# Patient Record
Sex: MALE | Race: BLACK OR AFRICAN AMERICAN
[De-identification: names, ages, dates, MRNs, and addresses within clinical notes are randomized per-mention and may not be internally consistent; named-entity substitution may affect disease eponyms.]

---

## 2019-10-04 ENCOUNTER — HOSPITAL ENCOUNTER (EMERGENCY)
Dept: HOSPITAL 61 - ER | Age: 64
Discharge: HOME | End: 2019-10-04
Payer: MEDICARE

## 2019-10-04 VITALS — DIASTOLIC BLOOD PRESSURE: 79 MMHG | SYSTOLIC BLOOD PRESSURE: 122 MMHG

## 2019-10-04 VITALS — WEIGHT: 180 LBS | BODY MASS INDEX: 24.38 KG/M2 | HEIGHT: 72 IN

## 2019-10-04 DIAGNOSIS — E11.9: ICD-10-CM

## 2019-10-04 DIAGNOSIS — F14.10: Primary | ICD-10-CM

## 2019-10-04 DIAGNOSIS — F12.10: ICD-10-CM

## 2019-10-04 DIAGNOSIS — R41.82: ICD-10-CM

## 2019-10-04 LAB
ACETAMIN: < 2 MCG/ML (ref 10–30)
ALBUMIN SERPL-MCNC: 3 G/DL (ref 3.4–5)
ALBUMIN/GLOB SERPL: 0.7 {RATIO} (ref 1–1.7)
ALP SERPL-CCNC: 122 U/L (ref 46–116)
ALT SERPL-CCNC: 53 U/L (ref 16–63)
AMPHETAMINE/METHAMPHETAMINE: (no result)
ANION GAP SERPL CALC-SCNC: 8 MMOL/L (ref 6–14)
APTT BLD: 24 SEC (ref 24–38)
APTT PPP: (no result) S
AST SERPL-CCNC: 75 U/L (ref 15–37)
BACTERIA #/AREA URNS HPF: 0 /HPF
BARBITURATES UR-MCNC: (no result) UG/ML
BASOPHILS # BLD AUTO: 0.1 X10^3/UL (ref 0–0.2)
BASOPHILS NFR BLD: 1 % (ref 0–3)
BENZODIAZ UR-MCNC: (no result) UG/L
BILIRUB SERPL-MCNC: 0.9 MG/DL (ref 0.2–1)
BILIRUB UR QL STRIP: (no result)
BUN SERPL-MCNC: 14 MG/DL (ref 8–26)
BUN/CREAT SERPL: 12 (ref 6–20)
CALCIUM SERPL-MCNC: 9.2 MG/DL (ref 8.5–10.1)
CANNABINOIDS UR-MCNC: (no result) UG/L
CHLORIDE SERPL-SCNC: 100 MMOL/L (ref 98–107)
CO2 SERPL-SCNC: 26 MMOL/L (ref 21–32)
COCAINE UR-MCNC: (no result) NG/ML
CREAT SERPL-MCNC: 1.2 MG/DL (ref 0.7–1.3)
EOSINOPHIL NFR BLD: 0 X10^3/UL (ref 0–0.7)
EOSINOPHIL NFR BLD: 1 % (ref 0–3)
ERYTHROCYTE [DISTWIDTH] IN BLOOD BY AUTOMATED COUNT: 16 % (ref 11.5–14.5)
ETHANOL SERPL-MCNC: < 10 MG/DL (ref 0–10)
FIBRINOGEN PPP-MCNC: CLEAR MG/DL
GFR SERPLBLD BASED ON 1.73 SQ M-ARVRAT: 73.5 ML/MIN
GLOBULIN SER-MCNC: 4.5 G/DL (ref 2.2–3.8)
GLUCOSE SERPL-MCNC: 306 MG/DL (ref 70–99)
HCT VFR BLD CALC: 45.4 % (ref 39–53)
HGB BLD-MCNC: 15.2 G/DL (ref 13–17.5)
LYMPHOCYTES # BLD: 2.4 X10^3/UL (ref 1–4.8)
LYMPHOCYTES NFR BLD AUTO: 38 % (ref 24–48)
MAGNESIUM SERPL-MCNC: 1.9 MG/DL (ref 1.8–2.4)
MCH RBC QN AUTO: 30 PG (ref 25–35)
MCHC RBC AUTO-ENTMCNC: 34 G/DL (ref 31–37)
MCV RBC AUTO: 90 FL (ref 79–100)
METHADONE SERPL-MCNC: (no result) NG/ML
MONO #: 0.5 X10^3/UL (ref 0–1.1)
MONOCYTES NFR BLD: 8 % (ref 0–9)
NEUT #: 3.4 X10^3/UL (ref 1.8–7.7)
NEUTROPHILS NFR BLD AUTO: 52 % (ref 31–73)
NITRITE UR QL STRIP: NEGATIVE
OPIATES UR-MCNC: (no result) NG/ML
PCP SERPL-MCNC: (no result) MG/DL
PH UR STRIP: 6 [PH]
PLATELET # BLD AUTO: 174 X10^3/UL (ref 140–400)
POTASSIUM SERPL-SCNC: 4.5 MMOL/L (ref 3.5–5.1)
PROT SERPL-MCNC: 7.5 G/DL (ref 6.4–8.2)
PROT UR STRIP-MCNC: 100 MG/DL
PROTHROMBIN TIME: 13 SEC (ref 11.7–14)
RBC # BLD AUTO: 5.02 X10^6/UL (ref 4.3–5.7)
RBC #/AREA URNS HPF: (no result) /HPF (ref 0–2)
SALIC: < 2.8 MG/DL (ref 2.8–20)
SODIUM SERPL-SCNC: 134 MMOL/L (ref 136–145)
SQUAMOUS #/AREA URNS LPF: (no result) /LPF
UROBILINOGEN UR-MCNC: 1 MG/DL
WBC # BLD AUTO: 6.4 X10^3/UL (ref 4–11)
WBC #/AREA URNS HPF: 0 /HPF (ref 0–4)

## 2019-10-04 PROCEDURE — 93005 ELECTROCARDIOGRAM TRACING: CPT

## 2019-10-04 PROCEDURE — 82962 GLUCOSE BLOOD TEST: CPT

## 2019-10-04 PROCEDURE — 85610 PROTHROMBIN TIME: CPT

## 2019-10-04 PROCEDURE — 80053 COMPREHEN METABOLIC PANEL: CPT

## 2019-10-04 PROCEDURE — 36415 COLL VENOUS BLD VENIPUNCTURE: CPT

## 2019-10-04 PROCEDURE — G0480 DRUG TEST DEF 1-7 CLASSES: HCPCS

## 2019-10-04 PROCEDURE — 80307 DRUG TEST PRSMV CHEM ANLYZR: CPT

## 2019-10-04 PROCEDURE — 99285 EMERGENCY DEPT VISIT HI MDM: CPT

## 2019-10-04 PROCEDURE — 83735 ASSAY OF MAGNESIUM: CPT

## 2019-10-04 PROCEDURE — 83880 ASSAY OF NATRIURETIC PEPTIDE: CPT

## 2019-10-04 PROCEDURE — 85730 THROMBOPLASTIN TIME PARTIAL: CPT

## 2019-10-04 PROCEDURE — 85025 COMPLETE CBC W/AUTO DIFF WBC: CPT

## 2019-10-04 PROCEDURE — 70450 CT HEAD/BRAIN W/O DYE: CPT

## 2019-10-04 PROCEDURE — 81001 URINALYSIS AUTO W/SCOPE: CPT

## 2019-10-04 PROCEDURE — 84484 ASSAY OF TROPONIN QUANT: CPT

## 2019-10-04 PROCEDURE — 71045 X-RAY EXAM CHEST 1 VIEW: CPT

## 2019-10-04 PROCEDURE — 80329 ANALGESICS NON-OPIOID 1 OR 2: CPT

## 2019-10-04 NOTE — PHYS DOC
Past Medical History


Past Medical History:  Diabetes-Type II


Past Surgical History:  No Surgical History


Alcohol Use:  Occasionally


Drug Use:  None





Adult General


Chief Complaint


Chief Complaint:  ALTERED MENTAL STATUS





HPI


HPI


63-year-old male presenting the emergency department today for altered mental 

status. EMS states the patient may have had a seizure but it was not a witnessed

event. He did not hit his head. He denies drinking. His only medical condition 

is diabetes and has not taken insulin recently. He denies any pain. Onset today.

Location generalized. Duration constant. No alleviating factors. EMS denies the 

patient have any focal neurologic deficits.





Review of systems is negative for chest pain shortness of breath abdominal pain 

fevers and nuchal rigidity or chills. All other review of systems is negative.





ED course: 63-year-old male presenting the emergency department today with 

altered mental status. On arrival the patient is tachycardic afebrile saturating

well on room air. EKG obtained and reviewed by myself shows sinus tachycardia. 

ST segments are mild appropriate repolarization. Does not meet STEMI criteria. 

Not suggestive of acute ischemic changes. Blood work obtained along with head CT

and chest x-ray and urinalysis. Blood work shows mildly elevated blood sugar. 

Otherwise grossly unremarkable. Coags negative. Urine drug screen shows cocaine 

and marijuana. Negative for salicylates acetaminophen or alcohol. Urinalysis 

shows glucosuria with increased specific gravity. Otherwise not suggestive of 

infection. Head CT is negative. Chest x-ray is negative. On reexamination the 

patient is more alert and oriented to person and place. We will do an ambulatory

trial with plans to probably discharge. Patient is able to ambulate in the 

emergency department today without difficulty. He ate breakfast without 

difficulty. We will discharge the patient and refer him to his PCP in one to 2 

days for follow-up.





Current Medications


Current Medications





Current Medications








 Medications


  (Trade)  Dose


 Ordered  Sig/Kate  Start Time


 Stop Time Status Last Admin


Dose Admin


 


 Midazolam HCl


  (Versed)  2 mg  STK-MED ONCE  10/4/19 09:41


 10/4/19 09:42 DC  





 


 Propofol  50 ml @ As


 Directed  STK-MED ONCE  10/4/19 09:41


 10/4/19 09:41 DC  





 


 Sodium Chloride  500 ml @ 


 500 mls/hr  1X  ONCE  10/4/19 08:15


 10/4/19 09:14 DC 10/4/19 08:50


500 MLS/HR











Allergies


Allergies





Allergies








Coded Allergies Type Severity Reaction Last Updated Verified


 


  No Known Drug Allergies    10/4/19 No











Physical Exam


Physical Exam





Constitutional: Well developed, well nourished, no acute distress, non-toxic 

appearance. []


HENT: Normocephalic, atraumatic, bilateral external ears normal, oropharynx 

moist, no oral exudates, nose normal. []


Eyes: PERRLA, EOMI, conjunctiva normal, no discharge. [] 


Neck: Normal range of motion, no tenderness, supple, no stridor. [] 


Cardiovascular:Heart rate regular rhythm, no murmur []


Lungs & Thorax:  Bilateral breath sounds clear to auscultation []


Abdomen: Bowel sounds normal, soft, no tenderness, no masses, no pulsatile 

masses. [] 


Skin: Warm, dry, no erythema, no rash. [] 


Back: No tenderness, no CVA tenderness. [] 


Extremities: No tenderness, no cyanosis, no clubbing, ROM intact, no edema. [] 


Neurologic: Alert and oriented X 3, normal motor function, normal sensory 

function, no focal deficits noted. []





Mental status: Awake  alert  and oriented to person. 





Cranial nerves: Extraocular movements intact, eyebrows derick bilaterally, smile 

symmetric, uvula elevation nl, shoulder shrug intact bilaterally, tongue 

protrusion normal





Sensation: equal and normal in all extremities





Strength: 5/5 in upper and lower extremities bilaterally





Psych: Normal mood normal affect. Not suicidal or homicidal.





Current Patient Data


Vital Signs





                                   Vital Signs








  Date Time  Temp Pulse Resp B/P (MAP) Pulse Ox O2 Delivery O2 Flow Rate FiO2


 


10/4/19 10:17  98 16  98   


 


10/4/19 08:01 98.4   133/84 (100)  Room Air  





 98.4       








Lab Values





                                Laboratory Tests








Test


 10/4/19


08:00 10/4/19


08:02 10/4/19


08:50 10/4/19


09:14


 


White Blood Count


 6.4 x10^3/uL


(4.0-11.0) 


 


 





 


Red Blood Count


 5.02 x10^6/uL


(4.30-5.70) 


 


 





 


Hemoglobin


 15.2 g/dL


(13.0-17.5) 


 


 





 


Hematocrit


 45.4 %


(39.0-53.0) 


 


 





 


Mean Corpuscular Volume


 90 fL ()


 


 


 





 


Mean Corpuscular Hemoglobin 30 pg (25-35)     


 


Mean Corpuscular Hemoglobin


Concent 34 g/dL


(31-37) 


 


 





 


Red Cell Distribution Width


 16.0 %


(11.5-14.5)  H 


 


 





 


Platelet Count


 174 x10^3/uL


(140-400) 


 


 





 


Neutrophils (%) (Auto) 52 % (31-73)     


 


Lymphocytes (%) (Auto) 38 % (24-48)     


 


Monocytes (%) (Auto) 8 % (0-9)     


 


Eosinophils (%) (Auto) 1 % (0-3)     


 


Basophils (%) (Auto) 1 % (0-3)     


 


Neutrophils # (Auto)


 3.4 x10^3/uL


(1.8-7.7) 


 


 





 


Lymphocytes # (Auto)


 2.4 x10^3/uL


(1.0-4.8) 


 


 





 


Monocytes # (Auto)


 0.5 x10^3/uL


(0.0-1.1) 


 


 





 


Eosinophils # (Auto)


 0.0 x10^3/uL


(0.0-0.7) 


 


 





 


Basophils # (Auto)


 0.1 x10^3/uL


(0.0-0.2) 


 


 





 


Prothrombin Time


 13.0 SEC


(11.7-14.0) 


 


 





 


Prothrombin Time INR 1.0 (0.8-1.1)     


 


Activated Partial


Thromboplast Time 24 SEC (24-38)


 


 


 





 


Glucose (Fingerstick)


 


 323 mg/dL


(70-99)  H 


 





 


Sodium Level


 


 


 134 mmol/L


(136-145)  L 





 


Potassium Level


 


 


 4.5 mmol/L


(3.5-5.1) 





 


Chloride Level


 


 


 100 mmol/L


() 





 


Carbon Dioxide Level


 


 


 26 mmol/L


(21-32) 





 


Anion Gap   8 (6-14)   


 


Blood Urea Nitrogen


 


 


 14 mg/dL


(8-26) 





 


Creatinine


 


 


 1.2 mg/dL


(0.7-1.3) 





 


Estimated GFR


(Cockcroft-Gault) 


 


 73.5  


 





 


BUN/Creatinine Ratio   12 (6-20)   


 


Glucose Level


 


 


 306 mg/dL


(70-99)  H 





 


Calcium Level


 


 


 9.2 mg/dL


(8.5-10.1) 





 


Magnesium Level


 


 


 1.9 mg/dL


(1.8-2.4) 





 


Total Bilirubin


 


 


 0.9 mg/dL


(0.2-1.0) 





 


Aspartate Amino Transferase


(AST) 


 


 75 U/L (15-37)


H 





 


Alanine Aminotransferase (ALT)


 


 


 53 U/L (16-63)


 





 


Alkaline Phosphatase


 


 


 122 U/L


()  H 





 


Troponin I Quantitative


 


 


 < 0.017 ng/mL


(0.000-0.055) 





 


NT-Pro-B-Type Natriuretic


Peptide 


 


 4292 pg/mL


(0-124)  H 





 


Total Protein


 


 


 7.5 g/dL


(6.4-8.2) 





 


Albumin


 


 


 3.0 g/dL


(3.4-5.0)  L 





 


Albumin/Globulin Ratio


 


 


 0.7 (1.0-1.7)


L 





 


Salicylates Level


 


 


 < 2.8 mg/dL


(2.8-20.0)  L 





 


Salicylate Last Dose Date   Unknown   


 


Salicylate Last Dose Time   Unknown   


 


Acetaminophen Level


 


 


 < 2 mcg/ml


(10-30)  L 





 


Acetaminophen Last Dose Date   Unknown   


 


Acetaminophen Last Dose Time   Unknown   


 


Ethyl Alcohol Level


 


 


 < 10 mg/dL


(0-10) 





 


Urine Collection Type    Unknown  


 


Urine Color    Tayler  


 


Urine Clarity    Clear  


 


Urine pH    6.0  


 


Urine Specific Gravity    >=1.030  


 


Urine Protein


 


 


 


 100 mg/dL


(NEG-TRACE)


 


Urine Glucose (UA)


 


 


 


 >=1000 mg/dL


(NEG)


 


Urine Ketones (Stick)


 


 


 


 Trace mg/dL


(NEG)


 


Urine Blood


 


 


 


 Negative (NEG)





 


Urine Nitrite


 


 


 


 Negative (NEG)





 


Urine Bilirubin    Small (NEG)  


 


Urine Urobilinogen Dipstick


 


 


 


 1.0 mg/dL (0.2


mg/dL)


 


Urine Leukocyte Esterase


 


 


 


 Negative (NEG)





 


Urine RBC


 


 


 


 1-2 /HPF (0-2)





 


Urine WBC    0 /HPF (0-4)  


 


Urine Squamous Epithelial


Cells 


 


 


 Occ /LPF  





 


Urine Bacteria


 


 


 


 0 /HPF (0-FEW)





 


Urine Mucus    Slight /LPF  


 


Urine Opiates Screen    Neg (NEG)  


 


Urine Methadone Screen    Neg (NEG)  


 


Urine Barbiturates    Neg (NEG)  


 


Urine Phencyclidine Screen    Neg (NEG)  


 


Urine


Amphetamine/Methamphetamine 


 


 


 Neg (NEG)  





 


Urine Benzodiazepines Screen    Neg (NEG)  


 


Urine Cocaine Screen    Pos (NEG)  


 


Urine Cannabinoids Screen    Pos (NEG)  


 


Urine Ethyl Alcohol    Neg (NEG)  





                                Laboratory Tests


10/4/19 08:00








                                Laboratory Tests


10/4/19 08:50














EKG


EKG


[]





Radiology/Procedures


Radiology/Procedures


[]





Course & Med Decision Making


Course & Med Decision Making


Pertinent Labs and Imaging studies reviewed. (See chart for details)





[]





Dragon Disclaimer


Dragon Disclaimer


This electronic medical record was generated, in whole or in part, using a voice

 recognition dictation system.





Departure


Departure


Impression:  


   Primary Impression:  


   Drug abuse


Disposition:  01 HOME, SELF-CARE


Condition:  STABLE


Patient Instructions:  Drug Abuse, FAQs





Additional Instructions:  


Thank you for allowing us to participate in your care today.





Return to the emergency department you have any new or worsening symptoms, or if

 you are concerned for any reason. Return to emergency department if you have 

any  new or concerning symptoms including but not limited to fever, chills, 

nausea, vomiting, intractable pain, any new rashes, chest pain, shortness of 

air, uncontrolled bleeding, difficulty breathing, and/or vision loss.





Follow up with your primary care physician within 1-2 days.  Call your Primary 

Doctor tomorrow and inform them of your visit today.  If you do not have a 

primary care provider we are happy to provide you with a list of our primary 

care providers contact information. 





This condition should be evaluated by your primary care physician and any 

recommended consulting services for continued management within 2 days after 

discharge. If at any time, you are having difficulty getting into your primary 

care doctor or a specialist, return to the emergency department.











RICARDO MENDENHALL MD                Oct 4, 2019 08:20

## 2019-10-04 NOTE — EKG
[facility]

              [address] [place] [address]

Test Date:    2019-10-04               Test Time:    07:56:11

Pat Name:     CARLITA RICCI            Department:   

Patient ID:   [id number]           Room:          

Gender:       M                        Technician:   

:          [id number]               Requested By: RICARDO MENDENHALL

Order Number: [id number]           Reading MD:     

                                 Measurements

Intervals                              Axis          

Rate:         122                      P:            -24

DE:           132                      QRS:          11

QRSD:         92                       T:            46

QT:           326                                    

QTc:          466                                    

                           Interpretive Statements

SINUS TACHYCARDIA

LEFT ATRIAL ABNORMALITY

ABNORMAL ECG

RI6.01

No previous ECG available for comparison

## 2019-10-04 NOTE — RAD
PORTABLE CHEST 1V

 

History: Altered

 

Comparison: None.

 

Findings:

No consolidation or pleural effusion. Normal heart size. Calcified 

bibasilar granulomas likely related to prior granulomatous disease. No 

pneumothorax.

 

Impression: 

1.  No acute cardiopulmonary process.

 

Electronically signed by: Edis Barrera DO (10/4/2019 8:33 AM) [id number]

## 2019-10-04 NOTE — RAD
CT HEAD WO CONTRAST

 

History: Altered

 

Comparison: None.

 

Technique: Noncontrast CT imaging was performed of the head.  Coronal 

reconstruction was performed.

 

Exposure: One or more of the following individualized dose reduction 

techniques were utilized for this examination:  

1. Automated exposure control  

2. Adjustment of the mA and/or kV according to patient size  

3. Use of iterative reconstruction technique.

 

Findings: 

No intracranial hemorrhage. No mass effect. No hydrocephalus. Extra-axial 

spaces are unremarkable. 

 

Mild foci of decreased attenuation within the hemispheric white matter, 

most often due to chronic microvascular ischemia.

 

Imaged orbits are unremarkable. Left maxillary sinus mucous retention 

cyst. Otherwise, the paranasal sinuses and mastoid air cells are clear. No

acute calvarial fracture. Chronic left zygomatic arch fracture.

 

Impression:

1.  No acute intracranial abnormality. 

 

Electronically signed by: Edis Barrera DO (10/4/2019 9:29 AM) [id number]